# Patient Record
Sex: FEMALE | Race: WHITE | NOT HISPANIC OR LATINO
[De-identification: names, ages, dates, MRNs, and addresses within clinical notes are randomized per-mention and may not be internally consistent; named-entity substitution may affect disease eponyms.]

---

## 2018-02-18 ENCOUNTER — TRANSCRIPTION ENCOUNTER (OUTPATIENT)
Age: 48
End: 2018-02-18

## 2018-04-24 ENCOUNTER — TRANSCRIPTION ENCOUNTER (OUTPATIENT)
Age: 48
End: 2018-04-24

## 2018-08-24 ENCOUNTER — TRANSCRIPTION ENCOUNTER (OUTPATIENT)
Age: 48
End: 2018-08-24

## 2019-04-25 ENCOUNTER — FORM ENCOUNTER (OUTPATIENT)
Age: 49
End: 2019-04-25

## 2019-05-21 ENCOUNTER — FORM ENCOUNTER (OUTPATIENT)
Age: 49
End: 2019-05-21

## 2019-08-27 ENCOUNTER — APPOINTMENT (OUTPATIENT)
Dept: CARDIOLOGY | Facility: CLINIC | Age: 49
End: 2019-08-27
Payer: COMMERCIAL

## 2019-08-27 PROCEDURE — 93000 ELECTROCARDIOGRAM COMPLETE: CPT

## 2019-08-27 PROCEDURE — 99214 OFFICE O/P EST MOD 30 MIN: CPT

## 2019-10-08 ENCOUNTER — OUTPATIENT (OUTPATIENT)
Dept: OUTPATIENT SERVICES | Facility: HOSPITAL | Age: 49
LOS: 1 days | Discharge: HOME | End: 2019-10-08
Payer: COMMERCIAL

## 2019-10-08 DIAGNOSIS — R07.9 CHEST PAIN, UNSPECIFIED: ICD-10-CM

## 2019-10-08 PROCEDURE — 78452 HT MUSCLE IMAGE SPECT MULT: CPT | Mod: 26

## 2020-01-31 ENCOUNTER — EMERGENCY (EMERGENCY)
Facility: HOSPITAL | Age: 50
LOS: 0 days | Discharge: HOME | End: 2020-01-31
Attending: EMERGENCY MEDICINE | Admitting: EMERGENCY MEDICINE
Payer: COMMERCIAL

## 2020-01-31 VITALS
DIASTOLIC BLOOD PRESSURE: 69 MMHG | HEART RATE: 81 BPM | TEMPERATURE: 97 F | OXYGEN SATURATION: 97 % | SYSTOLIC BLOOD PRESSURE: 130 MMHG | RESPIRATION RATE: 18 BRPM

## 2020-01-31 VITALS
HEIGHT: 68 IN | HEART RATE: 101 BPM | RESPIRATION RATE: 18 BRPM | DIASTOLIC BLOOD PRESSURE: 88 MMHG | TEMPERATURE: 98 F | OXYGEN SATURATION: 98 % | WEIGHT: 167.99 LBS | SYSTOLIC BLOOD PRESSURE: 140 MMHG

## 2020-01-31 DIAGNOSIS — J02.9 ACUTE PHARYNGITIS, UNSPECIFIED: ICD-10-CM

## 2020-01-31 DIAGNOSIS — Z79.899 OTHER LONG TERM (CURRENT) DRUG THERAPY: ICD-10-CM

## 2020-01-31 DIAGNOSIS — R68.83 CHILLS (WITHOUT FEVER): ICD-10-CM

## 2020-01-31 LAB
FLU A RESULT: NEGATIVE — SIGNIFICANT CHANGE UP
FLU A RESULT: NEGATIVE — SIGNIFICANT CHANGE UP
FLUAV AG NPH QL: NEGATIVE — SIGNIFICANT CHANGE UP
FLUBV AG NPH QL: NEGATIVE — SIGNIFICANT CHANGE UP
RSV RESULT: NEGATIVE — SIGNIFICANT CHANGE UP
RSV RNA RESP QL NAA+PROBE: NEGATIVE — SIGNIFICANT CHANGE UP

## 2020-01-31 PROCEDURE — 99283 EMERGENCY DEPT VISIT LOW MDM: CPT

## 2020-01-31 RX ORDER — ROSUVASTATIN CALCIUM 5 MG/1
0 TABLET ORAL
Qty: 0 | Refills: 0 | DISCHARGE

## 2020-01-31 RX ORDER — LISINOPRIL 2.5 MG/1
0 TABLET ORAL
Qty: 0 | Refills: 0 | DISCHARGE

## 2020-01-31 RX ORDER — ASPIRIN/CALCIUM CARB/MAGNESIUM 324 MG
0 TABLET ORAL
Qty: 0 | Refills: 0 | DISCHARGE

## 2020-01-31 NOTE — ED PROVIDER NOTE - ATTENDING CONTRIBUTION TO CARE
assx in ED.  VS noted.  Chest clear.  Heart RR no murmur.  abd NT.  Ext FROM.  =pulses. dx testing reviewed.

## 2020-01-31 NOTE — ED PROVIDER NOTE - PHYSICAL EXAMINATION
CONSTITUTIONAL: well developed, nontoxic appearing, in no acute distress, speaking in full sentences  SKIN: warm, dry, no rash, cap refill < 2 seconds  HEENT: normocephalic, atraumatic, no conjunctival erythema, moist mucous membranes, patent airway, no tonsillar erythema/swelling/exudates, uvula midline, no dysphonia  NECK: supple, no masses, no anterior/posterior cervical lymphadenopathy   CV:  regular rate, regular rhythm, 2+ radial pulses bilaterally  RESP: no wheezes, no rales, no rhonchi, normal work of breathing  ABD: soft, nontender, nondistended, no rebound, no guarding  MSK: normal ROM, no cyanosis, no edema  NEURO: alert, oriented, grossly unremarkable  PSYCH: cooperative, appropriate

## 2020-01-31 NOTE — ED PROVIDER NOTE - CARE PROVIDER_API CALL
Terell Salguero (MD)  Medicine  [address] [place] [address]  Phone: [phone]  Fax: [phone]  Follow Up Time:

## 2020-01-31 NOTE — ED PROVIDER NOTE - NS ED ROS FT
GEN:  no fever, + chills  NEURO:  no headache, no dizziness  ENT: + sore throat, no runny nose  CV:  no chest pain, no palpitations  RESP:  no sob, no cough  GI:  no nausea, no vomiting, no abdominal pain, no diarrhea  :  no dysuria, no urinary frequency, no hematuria  MSK:  no joint pain, no edema  SKIN:  no rash, no bruising  HEME: no hematochezia, no melena

## 2020-01-31 NOTE — ED PROVIDER NOTE - PATIENT PORTAL LINK FT
You can access the FollowMyHealth Patient Portal offered by [facility] by registering at the following website: [email or web address]. By joining [facility]’s FollowMyHealth portal, you will also be able to view your health information using other applications (apps) compatible with our system.

## 2020-01-31 NOTE — ED PROVIDER NOTE - OBJECTIVE STATEMENT
48 yo F with PMHx of DM who was sent in for flu test. 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 Today she had mild chills and sore throat and was told by her boss that she needed to be tested for the flu before she can return to work. No fever, cough, runny nose, nausea, vomiting, abd pain, diarrhea.

## 2020-04-21 ENCOUNTER — APPOINTMENT (OUTPATIENT)
Dept: CARDIOLOGY | Facility: CLINIC | Age: 50
End: 2020-04-21

## 2020-04-25 ENCOUNTER — MESSAGE (OUTPATIENT)
Age: 50
End: 2020-04-25

## 2020-07-07 ENCOUNTER — APPOINTMENT (OUTPATIENT)
Dept: CARDIOLOGY | Facility: CLINIC | Age: 50
End: 2020-07-07
Payer: COMMERCIAL

## 2020-07-07 PROCEDURE — 93880 EXTRACRANIAL BILAT STUDY: CPT

## 2020-07-21 ENCOUNTER — APPOINTMENT (OUTPATIENT)
Dept: CARDIOLOGY | Facility: CLINIC | Age: 50
End: 2020-07-21
Payer: COMMERCIAL

## 2020-07-21 PROBLEM — I10 ESSENTIAL (PRIMARY) HYPERTENSION: Chronic | Status: ACTIVE | Noted: 2020-01-31

## 2020-07-21 PROBLEM — E11.9 TYPE 2 DIABETES MELLITUS WITHOUT COMPLICATIONS: Chronic | Status: ACTIVE | Noted: 2020-01-31

## 2020-07-21 PROBLEM — E78.00 PURE HYPERCHOLESTEROLEMIA, UNSPECIFIED: Chronic | Status: ACTIVE | Noted: 2020-01-31

## 2020-07-21 PROCEDURE — 99214 OFFICE O/P EST MOD 30 MIN: CPT

## 2020-07-21 PROCEDURE — 93000 ELECTROCARDIOGRAM COMPLETE: CPT

## 2021-02-09 ENCOUNTER — FORM ENCOUNTER (OUTPATIENT)
Age: 51
End: 2021-02-09

## 2021-04-05 ENCOUNTER — APPOINTMENT (OUTPATIENT)
Dept: CARDIOLOGY | Facility: CLINIC | Age: 51
End: 2021-04-05
Payer: COMMERCIAL

## 2021-04-05 PROCEDURE — 93306 TTE W/DOPPLER COMPLETE: CPT

## 2021-04-05 PROCEDURE — 99072 ADDL SUPL MATRL&STAF TM PHE: CPT

## 2021-04-15 ENCOUNTER — APPOINTMENT (OUTPATIENT)
Dept: CARDIOLOGY | Facility: CLINIC | Age: 51
End: 2021-04-15
Payer: COMMERCIAL

## 2021-04-15 VITALS
BODY MASS INDEX: 25.9 KG/M2 | WEIGHT: 165 LBS | TEMPERATURE: 98 F | SYSTOLIC BLOOD PRESSURE: 150 MMHG | HEART RATE: 88 BPM | HEIGHT: 67 IN | DIASTOLIC BLOOD PRESSURE: 90 MMHG

## 2021-04-15 DIAGNOSIS — Z00.00 ENCOUNTER FOR GENERAL ADULT MEDICAL EXAMINATION W/OUT ABNORMAL FINDINGS: ICD-10-CM

## 2021-04-15 PROCEDURE — 99214 OFFICE O/P EST MOD 30 MIN: CPT

## 2021-04-15 PROCEDURE — 93000 ELECTROCARDIOGRAM COMPLETE: CPT

## 2021-04-15 PROCEDURE — 99072 ADDL SUPL MATRL&STAF TM PHE: CPT

## 2021-08-05 ENCOUNTER — APPOINTMENT (OUTPATIENT)
Dept: OBGYN | Facility: CLINIC | Age: 51
End: 2021-08-05
Payer: COMMERCIAL

## 2021-08-05 VITALS
SYSTOLIC BLOOD PRESSURE: 143 MMHG | HEART RATE: 94 BPM | DIASTOLIC BLOOD PRESSURE: 90 MMHG | HEIGHT: 67 IN | WEIGHT: 168 LBS | BODY MASS INDEX: 26.37 KG/M2

## 2021-08-05 PROCEDURE — 99213 OFFICE O/P EST LOW 20 MIN: CPT

## 2021-08-05 NOTE — HISTORY OF PRESENT ILLNESS
[FreeTextEntry1] : ascus....for repeat pap\par \par last visit\par \par    	Print\par Patient\par Name	ALIS POE (49yo, F) ID# [id number]	Appt. Date/Time	02/10/2021 04:00PM\par 	1970	Service Dept.	[facility] OFFICE\par Provider	SHERLY HARRIS MD\par Insurance	\par Med Primary: [employer]\par Insurance # : [id number]\par Prescription: [employer] - Member is eligible. details\par Chief Complaint\par Well Woman Visit\par Patient's Care Team\par Primary Care Provider: LYUDMILA EDMONDS MD: [address] [address], Ph [phone], Fax [phone] NPI: [id number]\par Notes: Dr Edmonds moved to [facility] same TEL #\par Patient's Pharmacies\par [id number] ([place]): [address] [place] [address], Ph [phone], Fax [phone]\par [facility] - [facility] [address] [place] [address], Ph [phone]\par Vitals\par 02/10/2021 04:28 pm\par Ht:	5 ft 7 in\par Wt:	170 lbs\par BMI:	26.6\par BP:	122/78 sitting\par Allergies\par Reviewed Allergies\par NKDA\par Medications\par Reviewed Medications\par Accu-Chek Judie Plus Meter\par 05/28/15   filled	[facility]\par acyclovir 5 % topical ointment\par 18   filled	[facility]\par amoxicillin 500 mg tablet\par 10/14/15   filled	[facility]\par amoxicillin 500 mg-potassium clavulanate 125 mg tablet\par 17   filled	[facility]\par amoxicillin 875 mg tablet\par 10/01/18   filled	[facility]\par azithromycin 250 mg tablet\par UTD\par 20   filled	[facility]\par chlorhexidine gluconate 0.12 % mouthwash\par 10/14/15   filled	[facility]\par clotrimazole-betamethasone 1 %-0.05 % topical cream\par APPLY TO THE AFFECTED AND SURROUNDING AREAS OF SKIN BY TOPICAL ROUTE 2 TIMES PER DAY IN THE MORNING AND EVENING FOR 2 WEEKS\par 18   filled	[facility]\par Contour Next Test Strips\par 10/28/20   filled	[facility]\par Dexcom G6  misc\par U TO MONITOR GLUCOSE\par 20   filled	[facility]\par Dexcom G6 Sensor device\par CHANGE Q 10 DAYS UTD\par 09/10/20   filled	[facility]\par Dexcom G6 Transmitter device\par CHANGE Q 90 DAYS UTD\par 20   filled	[facility]\par ergocalciferol (vitamin D2) 1,250 mcg (50,000 unit) capsule\par 10/05/20   filled	[facility]\par erythromycin 5 mg/gram (0.5 %) eye ointment\par APPLY A SMALL AMOUNT INTO RIGHT EYE TID\par 20   filled	[facility]\par fluocinolone 0.01 % topical solution\par 19   filled	[facility]\par GaviLyte-G 236 gram-22.74 gram-6.74 gram-5.86 gram oral solution\par 17   filled	[facility]\par gentamicin 0.3 % eye drops\par 18   filled	[facility]\par HumaLOG U-100 Insulin 100 unit/mL subcutaneous solution\par 10/28/20   filled	[facility]\par ibuprofen 400 mg tablet\par 16   filled	[facility]\par ketoconazole 2 % shampoo\par 05/15/19   filled	[facility]\par lancets 30 gauge\par 05/28/15   filled	[facility]\par lancing device\par 05/28/15   filled	[facility]\par losartan 25 mg tablet\par 20   filled	[facility]\par Lupron Depot 11.25 mg (3 month) intramuscular syringe kit\par 16   filled	[facility]\par neomycin-polymyxin-hydrocort 3.5 mg-10,000 unit/mL-1 % ear drops,susp\par 19   filled	[facility]\par nystatin 100,000 unit/mL oral suspension\par 17   filled	[facility]\par rosuvastatin 5 mg tablet\par 10/15/20   filled	[facility]\par simvastatin 20 mg tablet\par 16   filled	[facility]\par sodium chloride 5 % eye drops\par INT 1 GTT IN OD QID UTD\par 20   filled	[facility]\par Synalar TS 0.01 % topical kit\par 16   filled	[facility]\par tobramycin 0.3 %-dexamethasone 0.1 % eye drops,suspension\par 18   filled	[facility]\par valACYclovir 1 gram tablet\par 18   filled	[facility]\par Problems\par Reviewed Problems\par Menorrhagia\par Gynecologic examination\par Family History\par Reviewed Family History\par Father	- Cerebrovascular accident ( age: 64)\par Mother	- Hypertensive disorder\par Social History\par Reviewed Social History\par Tobacco Smoking Status: Never smoker\par Most Recent Tobacco Use Screenin/10/2021\par Surgical History\par Surgical History not reviewed (last reviewed 01/15/2020)\par Endometrial ablation - 2016\par Caesarean Section - X2\par Dilation and Curettage\par Other - ENDOMETRIAL BIOPSY 2016 NORMAL AS PER DR. PULIDO\par GYN History\par Reviewed GYN History\par LMP: Definite.\par Date of LMP: 2016 (Notes: ablation).\par LUPRON ON \par Obstetric History\par Reviewed Obstetric History\par TOTAL	FULL	PRE	AB. I	AB. S	ECTOPICS	MULTIPLE	LIVING\par 2	2						2\par Past Medical History\par Past Medical History not reviewed (last reviewed 2018)\par Diabetes: Y\par Screening\par None recorded.\par HPI\par amenorrhea/cramps\par \par ROS\par Patient reports abdominal pain (lower) but reports no heartburn, no dysphagia, no nausea, no vomiting, no bowel movement changes, no diarrhea, no constipation, and no rectal bleeding. She reports abnormal bleeding but reports no hematuria, no flank pain, no trouble urinating, no incontinence, no rash, no lesion, no discharge, no vaginal odor, and no vaginal itching. She reports no fatigue, no fever, no significant weight gain, and no significant weight loss. She reports no abnormal moles and no rashes. She reports no irritation and no vision changes. She reports no hearing loss, no ear pain, no nose/sinus problems, no sore throat, no snoring, no dry mouth, and no mouth ulcers. She reports no dyspnea / shortness of breath, no cough, no sputum production, no hemoptysis, and no wheezing. She reports no chest pain, no palpitations, and no orthopnea. She reports no menstrual problems and no PMDD symptoms. She reports no menopausal symptoms. She reports no sexual problems. She reports no muscle aches, no muscle weakness, no arthralgias/joint pain, and no back pain. She reports no headaches, no dizziness, no LOC, no weakness, no numbness, and no seizures. She reports no depression, no alcoholism, and no sleep disturbances.\par Physical Exam\par Patient is a 50-year-old female.\par \par Chaperone: Chaperone: present.\par \par Female Genitalia: Vulva: no masses, atrophy, or lesions. Bladder/Urethra: no urethral discharge or mass and normal meatus and bladder non distended. Vagina no tenderness, erythema, cystocele, rectocele, abnormal vaginal discharge, or vesicle(s) or ulcers. Cervix: no discharge or cervical motion tenderness and grossly normal. Uterus: midline, mobile, non-tender, normal shape, no uterine prolapse, and enlarged. Adnexa/Parametria: no parametrial tenderness or mass and no adnexal tenderness or ovarian mass.\par \par Breast: Inspection/Palpation: no erythema, induration, tenderness, skin changes, abnormal secretions, or distinct masses and normal nipple appearance and non tender axillary lymph nodes.\par \par Abdomen: Auscultation/Inspection/Palpation: no tenderness, hepatomegaly, splenomegaly, masses, or CVA tenderness and soft, non-distended, and normal bowel sounds. Hernia: none palpated.\par Assessment / Plan\par 1. Gynecologic examination\par Z01.411: Encounter for gynecological examination (general) (routine) with abnormal findings\par PAP, LB\par MAMMO, SCREENING, BILATERAL\par \par 2. Atypical squamous cells of undetermined significance on cervical Papanicolaou smear -\par high risk pap panel negative, pap in 6 months\par \par R87.610: Atypical squamous cells of undetermined significance on cytologic smear of cervix (ASC-US)\par \par 3. Hypertrophy of uterus -\par cramps\par \par N85.2: Hypertrophy of uterus\par \par 4. Amenorrhea -\par SINCE ABLATION\par \par hormone profile to r/o menopause\par \par N91.2: Amenorrhea, unspecified\par US, TRANSVAGINAL\par \par US, TRANSVAGINAL\par \par Review of us, transvaginal taken on 02/10/2021 at [facility] OFF[facility] shows:\par Imaging Studies:\par Indications: pelvic pain and abnormal bleeding.\par Uterus: volume (cc): 125.\par Endometrium: thickness 2.3 mm.\par Cervix: normal.\par Cul de sac: no fluid was demonstrated.\par Right Ovary: volume (cc): 3.9.\par Left Ovary: volume (cc): 6.8.\par \par Return to Office\par None recorded.\par Encounter Sign-Off\par Encounter signed-off by Sherly Harris MD, 02/10/2021.\par Encounter performed and documented by Sherly Harris MD\par Encounter reviewed & signed by Sherly Harris MD on 02/10/2021 at 4:49pm\par Audit history\par

## 2021-08-12 LAB
C TRACH RRNA SPEC QL NAA+PROBE: NOT DETECTED
HPV HIGH+LOW RISK DNA PNL CVX: NOT DETECTED
N GONORRHOEA RRNA SPEC QL NAA+PROBE: NOT DETECTED
SOURCE AMPLIFICATION: NORMAL

## 2021-08-21 LAB — CYTOLOGY CVX/VAG DOC THIN PREP: NORMAL

## 2021-10-21 ENCOUNTER — APPOINTMENT (OUTPATIENT)
Dept: CARDIOLOGY | Facility: CLINIC | Age: 51
End: 2021-10-21
Payer: COMMERCIAL

## 2021-10-21 ENCOUNTER — RESULT CHARGE (OUTPATIENT)
Age: 51
End: 2021-10-21

## 2021-10-21 VITALS
HEART RATE: 74 BPM | OXYGEN SATURATION: 98 % | DIASTOLIC BLOOD PRESSURE: 80 MMHG | SYSTOLIC BLOOD PRESSURE: 112 MMHG | HEIGHT: 67 IN | TEMPERATURE: 98 F | BODY MASS INDEX: 24.8 KG/M2 | WEIGHT: 158 LBS

## 2021-10-21 PROCEDURE — 99213 OFFICE O/P EST LOW 20 MIN: CPT

## 2021-10-21 PROCEDURE — 93000 ELECTROCARDIOGRAM COMPLETE: CPT

## 2021-10-21 RX ORDER — LOSARTAN POTASSIUM 50 MG/1
50 TABLET, FILM COATED ORAL
Qty: 90 | Refills: 3 | Status: DISCONTINUED | COMMUNITY
Start: 2021-04-15 | End: 2021-10-21

## 2022-01-19 ENCOUNTER — APPOINTMENT (OUTPATIENT)
Dept: OBGYN | Facility: CLINIC | Age: 52
End: 2022-01-19
Payer: COMMERCIAL

## 2022-01-19 ENCOUNTER — APPOINTMENT (OUTPATIENT)
Dept: OBGYN | Facility: CLINIC | Age: 52
End: 2022-01-19

## 2022-01-19 ENCOUNTER — NON-APPOINTMENT (OUTPATIENT)
Age: 52
End: 2022-01-19

## 2022-01-19 VITALS
HEIGHT: 67 IN | HEART RATE: 82 BPM | TEMPERATURE: 96.8 F | BODY MASS INDEX: 25.11 KG/M2 | WEIGHT: 160 LBS | DIASTOLIC BLOOD PRESSURE: 81 MMHG | SYSTOLIC BLOOD PRESSURE: 131 MMHG

## 2022-01-19 DIAGNOSIS — B37.3 CANDIDIASIS OF VULVA AND VAGINA: ICD-10-CM

## 2022-01-19 PROCEDURE — 99396 PREV VISIT EST AGE 40-64: CPT

## 2022-01-19 NOTE — HISTORY OF PRESENT ILLNESS
[FreeTextEntry1] : feels well, except for recurrent yeast infections which she attributes to her diabetes\par hx of ascus\par here for annual

## 2022-01-22 LAB — HPV HIGH+LOW RISK DNA PNL CVX: NOT DETECTED

## 2022-01-25 LAB — CYTOLOGY CVX/VAG DOC THIN PREP: ABNORMAL

## 2022-03-25 ENCOUNTER — EMERGENCY (EMERGENCY)
Facility: HOSPITAL | Age: 52
LOS: 0 days | Discharge: HOME | End: 2022-03-25
Attending: EMERGENCY MEDICINE | Admitting: EMERGENCY MEDICINE
Payer: OTHER MISCELLANEOUS

## 2022-03-25 VITALS
HEART RATE: 98 BPM | SYSTOLIC BLOOD PRESSURE: 136 MMHG | TEMPERATURE: 97 F | DIASTOLIC BLOOD PRESSURE: 79 MMHG | OXYGEN SATURATION: 98 % | RESPIRATION RATE: 17 BRPM | HEIGHT: 68 IN
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
## 2022-03-25 DIAGNOSIS — Z79.82 LONG TERM (CURRENT) USE OF ASPIRIN: ICD-10-CM
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
## 2022-03-25 DIAGNOSIS — S29.012A STRAIN OF MUSCLE AND TENDON OF BACK WALL OF THORAX, INITIAL ENCOUNTER: ICD-10-CM
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
## 2022-03-25 DIAGNOSIS — E11.9 TYPE 2 DIABETES MELLITUS WITHOUT COMPLICATIONS: ICD-10-CM

## 2022-03-25 DIAGNOSIS — M25.512 PAIN IN LEFT SHOULDER: ICD-10-CM

## 2022-03-25 DIAGNOSIS — E78.00 PURE HYPERCHOLESTEROLEMIA, UNSPECIFIED: ICD-10-CM

## 2022-03-25 DIAGNOSIS — I10 ESSENTIAL (PRIMARY) HYPERTENSION: ICD-10-CM

## 2022-03-25 PROCEDURE — 73030 X-RAY EXAM OF SHOULDER: CPT | Mod: 26,LT

## 2022-03-25 PROCEDURE — 99284 EMERGENCY DEPT VISIT MOD MDM: CPT

## 2022-03-25 RX ORDER — METHOCARBAMOL 500 MG/1
2 TABLET, FILM COATED ORAL
Qty: 60 | Refills: 0
Start: 2022-03-25 | End: 2022-04-08

## 2022-03-25 NOTE — ED PROVIDER NOTE - NSFOLLOWUPINSTRUCTIONS_ED_ALL_ED_FT
Follow-up with Employee Health for modified work duty.    Strain    A strain is a stretch or tear in one of the muscles in your body. This is caused by an injury to the area such as a twisting mechanism. Symptoms include pain, swelling, or bruising. Rest that area over the next several days and slowly resume activity when tolerated. Ice can help with swelling and pain.     SEEK IMMEDIATE MEDICAL CARE IF YOU HAVE ANY OF THE FOLLOWING SYMPTOMS: worsening pain, inability to move that body part, numbness or tingling.

## 2022-03-25 NOTE — ED PROVIDER NOTE - PATIENT PORTAL LINK FT
You can access the FollowMyHealth Patient Portal offered by [facility] by registering at the following website: [email or web address]. By joining [facility]’s FollowMyHealth portal, you will also be able to view your health information using other applications (apps) compatible with our system.

## 2022-03-25 NOTE — ED PROVIDER NOTE - OBJECTIVE STATEMENT
patient c/o left shoulder pain, sudden onset 3 days ago while moving patient at work, Pain felt with movement, no numbness, no chest pain, no SOB,

## 2022-03-25 NOTE — ED PROVIDER NOTE - ATTENDING CONTRIBUTION TO CARE
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  Pain is worse with motion.  Does not radiate.  No other complaints    Exam: No bony tenderness of left shoulder, full range of motion of shoulder, normal skin, mild soreness of trapezius, 5 out of 5 hand , 2+ radial pulse  Plan: X-ray, muscle relaxants, NSAIDs

## 2022-03-25 NOTE — ED PROVIDER NOTE - NS ED ATTENDING STATEMENT MOD
This was a shared visit with the MICHELE. I reviewed and verified the documentation and independently performed the documented:

## 2022-03-25 NOTE — ED ADULT NURSE NOTE - OBJECTIVE STATEMENT
Pt a&ox3, in ED for a left shoulder injury after helping a patient get in bed, pt states pain progressively got worse and today and radiated to neck, no SOB, unlabored breathing, equal rise & fall, denies pain in any other location, no N/V/D, PMH of DM, HTN, HLD

## 2022-04-27 ENCOUNTER — APPOINTMENT (OUTPATIENT)
Dept: CARDIOLOGY | Facility: CLINIC | Age: 52
End: 2022-04-27
Payer: COMMERCIAL

## 2022-04-27 VITALS
HEIGHT: 67 IN | DIASTOLIC BLOOD PRESSURE: 84 MMHG | SYSTOLIC BLOOD PRESSURE: 142 MMHG | WEIGHT: 164.6 LBS | BODY MASS INDEX: 25.83 KG/M2

## 2022-04-27 PROCEDURE — 93000 ELECTROCARDIOGRAM COMPLETE: CPT

## 2022-04-27 PROCEDURE — 99213 OFFICE O/P EST LOW 20 MIN: CPT

## 2022-04-27 RX ORDER — INSULIN LISPRO 100 [IU]/ML
100 INJECTION, SOLUTION INTRAVENOUS; SUBCUTANEOUS
Refills: 0 | Status: ACTIVE | COMMUNITY

## 2022-04-27 RX ORDER — ROSUVASTATIN CALCIUM 5 MG/1
5 TABLET, FILM COATED ORAL DAILY
Qty: 90 | Refills: 3 | Status: COMPLETED | COMMUNITY
Start: 2021-10-21 | End: 2022-04-27

## 2022-04-27 RX ORDER — LOSARTAN POTASSIUM 25 MG/1
25 TABLET, FILM COATED ORAL
Qty: 90 | Refills: 2 | Status: COMPLETED | COMMUNITY
End: 2022-04-27

## 2022-04-27 RX ORDER — FLUCONAZOLE 200 MG/1
200 TABLET ORAL
Qty: 3 | Refills: 3 | Status: DISCONTINUED | COMMUNITY
Start: 2022-01-19 | End: 2022-04-27

## 2022-07-25 ENCOUNTER — APPOINTMENT (OUTPATIENT)
Dept: ORTHOPEDIC SURGERY | Facility: CLINIC | Age: 52
End: 2022-07-25

## 2022-07-25 PROCEDURE — 99213 OFFICE O/P EST LOW 20 MIN: CPT

## 2022-07-25 RX ORDER — BLOOD SUGAR DIAGNOSTIC
STRIP MISCELLANEOUS
Qty: 800 | Refills: 0 | Status: DISCONTINUED | COMMUNITY
Start: 2022-03-29

## 2022-07-25 RX ORDER — METHOCARBAMOL 500 MG/1
500 TABLET, FILM COATED ORAL
Qty: 60 | Refills: 0 | Status: DISCONTINUED | COMMUNITY
Start: 2022-03-25

## 2022-07-25 RX ORDER — INSULIN LISPRO 100 [IU]/ML
100 INJECTION, SOLUTION INTRAVENOUS; SUBCUTANEOUS
Qty: 90 | Refills: 0 | Status: DISCONTINUED | COMMUNITY
Start: 2022-03-05

## 2022-07-25 RX ORDER — MELOXICAM 15 MG/1
15 TABLET ORAL
Qty: 30 | Refills: 0 | Status: DISCONTINUED | COMMUNITY
Start: 2022-03-25

## 2022-07-25 NOTE — PHYSICAL EXAM
[Left] : left shoulder [] : no tenderness to palpation [de-identified] :   Good strength with rotator cuff resistance testing. [TWNoteComboBox7] : active forward flexion 170 degrees [de-identified] : active abduction 150 degrees [TWNoteComboBox6] : internal rotation L2

## 2022-07-25 NOTE — HISTORY OF PRESENT ILLNESS
[de-identified] : The patient is a 52-year-old female here for subsequent re-evaluation of her left shoulder.  MRI showed bursitis.  She is doing formal physical therapy and it has helped her.  She states her shoulder feels much better.  She is seeing Dr. Martin of Neurology for her lumbar spine.

## 2022-07-25 NOTE — DISCUSSION/SUMMARY
[de-identified] :   At this point her shoulder has improved nicely with the formal physical therapy.  She will convert to a home program.  I will see her back in 2 months for further evaluation.  She will see Dr. Martin of Neurology tomorrow for her lumbar spine.  She will call me if that appointment so we can determine when she will return to work.  Right now, she is unable work 100% temporary disabled.\par \par   Supervising physician:

## 2022-07-26 ENCOUNTER — APPOINTMENT (OUTPATIENT)
Dept: NEUROLOGY | Facility: CLINIC | Age: 52
End: 2022-07-26

## 2022-07-26 VITALS
HEART RATE: 85 BPM | DIASTOLIC BLOOD PRESSURE: 76 MMHG | BODY MASS INDEX: 25.74 KG/M2 | WEIGHT: 164 LBS | HEIGHT: 67 IN | SYSTOLIC BLOOD PRESSURE: 122 MMHG

## 2022-07-26 PROCEDURE — 99214 OFFICE O/P EST MOD 30 MIN: CPT

## 2022-07-26 NOTE — ASSESSMENT
[FreeTextEntry1] : Lumbar radiculopathy- much improved, even though she does still have residual pain  even without exerting herself.  However, she would like to try to return to work, and because there are no modified duty for her work, she would have to return to full duty.     since she is not doing physical therapy anymore, she is recommended to continue with her routine stretches.  She is no longer taking any  pain medication at this time\par \par   Left shoulder pain- resolved\par \par  she is considered to have mild partial disability due to residual pain, but is cleared to  try to go back to work full duty with no restrictions on August 8, 2022.

## 2022-07-26 NOTE — HISTORY OF PRESENT ILLNESS
[FreeTextEntry1] : It is a pleasure to see Ms.Alzbeta Weldon in the office today. She is a 52-year-old woman who presents to the office for the evaluation of back pain going down to her left buttock as well as left shoulder pain as a result of a work-related accident which took place on March 23, 2022. 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 The next day she developed severe back pain that was shooting to her left buttock as well as left shoulder pain. She was seen at Orthopedics and was found to have bursitis and with therapy that has gotten significantly better. Her left shoulder pain is now only rated at a 3 on a 10 at most. Her back pain however continues to bother her significantly. She has a baseline pain of 5-6/10 and it can go up to 8-9/10 be days a week. She has done physical therapy for about 6 sessions so far. She denies any significant weakness numbness or urinary/bowel incontinence. She has not worked since the accident.\par \par Today, patient presents for neurologic revisit encounter regarding back pain  as a result of a work-related accident which took place on March 23, 2022. Since last visit, patient reports improvement. She states rates her pain anywhere between 0-5/10 on the pain scale. Physical activity can trigger her pain. The pain is intermittent. She completed MRI of the lumbar spine which demonstrates bulging disc and degenerative changes. She is no longer going to therapy but does home exercises. The patient has not returned to work yet but is willing to try to return. Of note, with respect to her shoulder, she states also improving. She sees Orthopedics for further evaluation.

## 2022-08-16 ENCOUNTER — NON-APPOINTMENT (OUTPATIENT)
Age: 52
End: 2022-08-16

## 2022-09-21 ENCOUNTER — APPOINTMENT (OUTPATIENT)
Dept: NEUROLOGY | Facility: CLINIC | Age: 52
End: 2022-09-21

## 2022-09-21 VITALS
BODY MASS INDEX: 25.74 KG/M2 | SYSTOLIC BLOOD PRESSURE: 129 MMHG | HEIGHT: 67 IN | HEART RATE: 84 BPM | DIASTOLIC BLOOD PRESSURE: 83 MMHG | WEIGHT: 164 LBS

## 2022-09-21 DIAGNOSIS — M54.16 RADICULOPATHY, LUMBAR REGION: ICD-10-CM

## 2022-09-21 PROCEDURE — 99072 ADDL SUPL MATRL&STAF TM PHE: CPT

## 2022-09-21 PROCEDURE — 99213 OFFICE O/P EST LOW 20 MIN: CPT

## 2022-09-21 NOTE — HISTORY OF PRESENT ILLNESS
[FreeTextEntry1] : Ms. ALIS POE returns to the office for follow-up and her prior history and physical have been reviewed and she reports no change since last visit.  she has been seeing us for Back pain as a result of a work related accident which took place on March 23, 2022.  She did improve with physical therapy and 2 months ago we clear her back to work because pain was under control and she really wanted to go back to work.  Today's visit was a routine follow-up to see how well or hot symptomatic she still is after going back to work, however she never went back to work because Orthopedics never clear her back to work due to bursitis in her shoulders.  Her back pain has remained  relative the same as last time with mild pain between 1-4.   she has not done any physical therapy recently and has  only taken Tylenol infrequently.  No new complaints today\par \par  she reports that her shoulder is feeling better, and she really wants to go back to work.  She has appointment with Orthopedics coming up to get clearance

## 2022-09-21 NOTE — ASSESSMENT
[FreeTextEntry1] : Lumbar radiculopathy- much improved, even though she does still have residual pain  even without exerting herself.    no further neurological workup or intervention necessary at this time.  She is cleared to go back to work from Neurology standpoint, but will still need clearance from Orthopedics.\par \par   Left shoulder pain- resolved\par \par  she is considered to have mild partial disability due to residual pain, but is cleared to  try to go back to work full duty with no restrictions.

## 2022-09-26 ENCOUNTER — APPOINTMENT (OUTPATIENT)
Dept: ORTHOPEDIC SURGERY | Facility: CLINIC | Age: 52
End: 2022-09-26

## 2022-09-26 PROCEDURE — 99072 ADDL SUPL MATRL&STAF TM PHE: CPT | Mod: ACP

## 2022-09-26 PROCEDURE — 99213 OFFICE O/P EST LOW 20 MIN: CPT | Mod: ACP

## 2022-09-26 NOTE — PHYSICAL EXAM
[Left] : left shoulder [] : no tenderness to palpation [de-identified] :   Good strength with rotator cuff resistance testing. [TWNoteComboBox7] : active forward flexion 170 degrees [de-identified] : active abduction 165 degrees [TWNoteComboBox6] : internal rotation L2

## 2022-09-26 NOTE — HISTORY OF PRESENT ILLNESS
[de-identified] : The patient is a 52-year-old female here for subsequent re-evaluation of her left shoulder.  MRI showed bursitis.  She is doing formal physical therapy and it has helped her.  She states her shoulder feels much better.

## 2022-09-26 NOTE — DISCUSSION/SUMMARY
[de-identified] : The patient will return to work full duty mild degree of disability as of 9/27/2022, follow up 2 months. \par \par   Supervising physician: Dr. Koo

## 2022-11-04 ENCOUNTER — APPOINTMENT (OUTPATIENT)
Dept: CARDIOLOGY | Facility: CLINIC | Age: 52
End: 2022-11-04

## 2022-11-04 VITALS
TEMPERATURE: 97.6 F | HEART RATE: 74 BPM | DIASTOLIC BLOOD PRESSURE: 82 MMHG | SYSTOLIC BLOOD PRESSURE: 124 MMHG | RESPIRATION RATE: 16 BRPM | WEIGHT: 163 LBS | BODY MASS INDEX: 25.58 KG/M2 | HEIGHT: 67 IN

## 2022-11-04 DIAGNOSIS — Z78.9 OTHER SPECIFIED HEALTH STATUS: ICD-10-CM

## 2022-11-04 DIAGNOSIS — E78.5 HYPERLIPIDEMIA, UNSPECIFIED: ICD-10-CM

## 2022-11-04 DIAGNOSIS — Z79.4 TYPE 2 DIABETES MELLITUS W/OUT COMPLICATIONS: ICD-10-CM

## 2022-11-04 DIAGNOSIS — I10 ESSENTIAL (PRIMARY) HYPERTENSION: ICD-10-CM

## 2022-11-04 DIAGNOSIS — E11.9 TYPE 2 DIABETES MELLITUS W/OUT COMPLICATIONS: ICD-10-CM

## 2022-11-04 PROCEDURE — 93000 ELECTROCARDIOGRAM COMPLETE: CPT

## 2022-11-04 PROCEDURE — 99214 OFFICE O/P EST MOD 30 MIN: CPT

## 2022-11-04 RX ORDER — LOSARTAN POTASSIUM 50 MG/1
50 TABLET, FILM COATED ORAL
Qty: 90 | Refills: 3 | Status: DISCONTINUED | COMMUNITY
End: 2022-11-04

## 2022-11-04 RX ORDER — CLOTRIMAZOLE AND BETAMETHASONE DIPROPIONATE 10; .5 MG/G; MG/G
1-0.05 CREAM TOPICAL TWICE DAILY
Qty: 1 | Refills: 3 | Status: DISCONTINUED | COMMUNITY
Start: 2022-01-19 | End: 2022-11-04

## 2022-11-04 RX ORDER — ASPIRIN 81 MG
81 TABLET, DELAYED RELEASE (ENTERIC COATED) ORAL DAILY
Refills: 0 | Status: DISCONTINUED | COMMUNITY
End: 2022-11-04

## 2022-11-04 RX ORDER — ERGOCALCIFEROL 1.25 MG/1
1.25 MG CAPSULE, LIQUID FILLED ORAL
Qty: 12 | Refills: 0 | Status: ACTIVE | COMMUNITY
Start: 2022-01-12

## 2022-11-04 NOTE — HISTORY OF PRESENT ILLNESS
[FreeTextEntry1] : Ms. Weldon is a 52-year-old woman with history of HTN, HLD, and IDDM2 presenting for follow up.\par \par Patient previously followed with Dr. Eason and was last seen in office 4/2022.\par Today she feels well, denies active cardiopulmonary complaints.\par \par TTE 4/2021\par - Normal biventricular function, mild MR\par \par NM Stress Treadmill\par - 7 METS, 6.5 minutes, 95%MPHR\par - Negative stress test at a moderate workload\par \par Labs:\par - , HDL 54, TG 92, , non-\par - Cr 0.66, K 4.3\par - Self reported A1c ~7.8%

## 2022-11-04 NOTE — ASSESSMENT
[FreeTextEntry1] : Ms. Weldon is a 52-year-old woman with history of HTN, HLD, and IDDM2 presenting for follow up.\par \par Impression:\par (1) HTN, well controlled\par (2) HLD, goal LDL <70 given IDDM2\par (3) IDDM2, poorly controlled with reported A1c ~7.8%\par \par Plan:\par - Discussed her prior cardiac workup in detail including echocardiography and nuclear stress testing, both of which were unremarkable.\par - Discussed intermittent fasting as a means to improve insulin resistance/sensitivity and possibly lose weight.\par - Discontinue aspirin, patient is not at high risk for cardiovascular events.\par - Continue statin\par - Continue losartan\par \par RTC 6 months, repeat labs prior

## 2023-01-25 ENCOUNTER — APPOINTMENT (OUTPATIENT)
Dept: OBGYN | Facility: CLINIC | Age: 53
End: 2023-01-25
Payer: COMMERCIAL

## 2023-01-25 ENCOUNTER — NON-APPOINTMENT (OUTPATIENT)
Age: 53
End: 2023-01-25

## 2023-01-25 VITALS
DIASTOLIC BLOOD PRESSURE: 85 MMHG | HEART RATE: 83 BPM | SYSTOLIC BLOOD PRESSURE: 129 MMHG | WEIGHT: 166 LBS | BODY MASS INDEX: 26.06 KG/M2 | HEIGHT: 67 IN

## 2023-01-25 DIAGNOSIS — R87.610 ATYPICAL SQUAMOUS CELLS OF UNDETERMINED SIGNIFICANCE ON CYTOLOGIC SMEAR OF CERVIX (ASC-US): ICD-10-CM

## 2023-01-25 DIAGNOSIS — N95.1 MENOPAUSAL AND FEMALE CLIMACTERIC STATES: ICD-10-CM

## 2023-01-25 DIAGNOSIS — Z01.419 ENCOUNTER FOR GYNECOLOGICAL EXAMINATION (GENERAL) (ROUTINE) W/OUT ABNORMAL FINDINGS: ICD-10-CM

## 2023-01-25 PROCEDURE — 99396 PREV VISIT EST AGE 40-64: CPT

## 2023-01-25 NOTE — HISTORY OF PRESENT ILLNESS
[Hot Flashes] : hot flashes [Night Sweats] : night sweats [FreeTextEntry1] : here for her annual visit\par \par last NII VISIT:\par \par Patient\par Name	ALIS POE (51yo, F) ID# [id number]	Appt. Date/Time	02/10/2021 04:00PM\par 	1970	Service Dept.	[facility] OFFICE\par Provider	SHERLY HARRIS MD\par Insurance	\par Med Primary: [employer]\par Insurance # : [id number]\par Prescription: [employer] - Member is eligible. details\par Chief Complaint\par Well Woman Visit\par Patient's Care Team\par Primary Care Provider: LYUDMILA EDMONDS MD: [address] [address], Ph [phone], Fax [phone] NPI: [id number]\par Notes: Dr Edmonds moved to [address] [facility] same TEL #\par Patient's Pharmacies\par [id number] ([facility]): [address] [place] [address], Ph [phone], Fax [phone]\par [facility] - [address] [facility] [address] [place] [address], Ph [phone]\par Vitals\par 02/10/2021 04:28 pm\par Ht:	5 ft 7 in\par Wt:	170 lbs\par BMI:	26.6\par BP:	122/78 sitting\par Allergies\par Reviewed Allergies\par NKDA\par Medications\par Reviewed Medications\par Accu-Chek Judie Plus Meter\par 05/28/15   filled	[facility]\par acyclovir 5 % topical ointment\par 18   filled	[facility]\par amoxicillin 500 mg tablet\par 10/14/15   filled	[facility]\par amoxicillin 500 mg-potassium clavulanate 125 mg tablet\par 17   filled	[facility]\par amoxicillin 875 mg tablet\par 10/01/18   filled	[facility]\par azithromycin 250 mg tablet\par UTD\par 20   filled	[facility]\par chlorhexidine gluconate 0.12 % mouthwash\par 10/14/15   filled	[facility]\par clotrimazole-betamethasone 1 %-0.05 % topical cream\par APPLY TO THE AFFECTED AND SURROUNDING AREAS OF SKIN BY TOPICAL ROUTE 2 TIMES PER DAY IN THE MORNING AND EVENING FOR 2 WEEKS\par 18   filled	[facility]\par Contour Next Test Strips\par 10/28/20   filled	[facility]\par Dexcom G6  misc\par U TO MONITOR GLUCOSE\par 20   filled	[facility]\par Dexcom G6 Sensor device\par CHANGE Q 10 DAYS UTD\par 09/10/20   filled	[facility]\par Dexcom G6 Transmitter device\par CHANGE Q 90 DAYS UTD\par 20   filled	[facility]\par ergocalciferol (vitamin D2) 1,250 mcg (50,000 unit) capsule\par 10/05/20   filled	[facility]\par erythromycin 5 mg/gram (0.5 %) eye ointment\par APPLY A SMALL AMOUNT INTO RIGHT EYE TID\par 20   filled	[facility]\par fluocinolone 0.01 % topical solution\par 19   filled	[facility]\par GaviLyte-G 236 gram-22.74 gram-6.74 gram-5.86 gram oral solution\par 17   filled	[facility]\par gentamicin 0.3 % eye drops\par 18   filled	[facility]\par HumaLOG U-100 Insulin 100 unit/mL subcutaneous solution\par 10/28/20   filled	[facility]\par ibuprofen 400 mg tablet\par 16   filled	[facility]\par ketoconazole 2 % shampoo\par 05/15/19   filled	[facility]\par lancets 30 gauge\par 05/28/15   filled	[facility]\par lancing device\par 05/28/15   filled	[facility]\par losartan 25 mg tablet\par 20   filled	[facility]\par Lupron Depot 11.25 mg (3 month) intramuscular syringe kit\par 16   filled	[facility]\par neomycin-polymyxin-hydrocort 3.5 mg-10,000 unit/mL-1 % ear drops,susp\par 19   filled	[facility]\par nystatin 100,000 unit/mL oral suspension\par 17   filled	[facility]\par rosuvastatin 5 mg tablet\par 10/15/20   filled	[facility]\par simvastatin 20 mg tablet\par 16   filled	[facility]\par sodium chloride 5 % eye drops\par INT 1 GTT IN OD QID UTD\par 20   filled	[facility]\par Synalar TS 0.01 % topical kit\par 16   filled	[facility]\par tobramycin 0.3 %-dexamethasone 0.1 % eye drops,suspension\par 18   filled	[facility]\par valACYclovir 1 gram tablet\par 18   filled	[facility]\par Problems\par Reviewed Problems\par Menorrhagia\par Gynecologic examination\par Family History\par Reviewed Family History\par Father	- Cerebrovascular accident ( age: 64)\par Mother	- Hypertensive disorder\par Social History\par Reviewed Social History\par Tobacco Smoking Status: Never smoker\par Most Recent Tobacco Use Screenin/10/2021\par Surgical History\par Surgical History not reviewed (last reviewed 01/15/2020)\par Endometrial ablation - 2016\par Caesarean Section - X2\par Dilation and Curettage\par Other - ENDOMETRIAL BIOPSY 2016 NORMAL AS PER DR. PULIDO\par GYN History\par Reviewed GYN History\par LMP: Definite.\par Date of LMP: 2016 (Notes: ablation).\par LUPRON ON \par Obstetric History\par Reviewed Obstetric History\par TOTAL	FULL	PRE	AB. I	AB. S	ECTOPICS	MULTIPLE	LIVING\par 2	2						2\par Past Medical History\par Past Medical History not reviewed (last reviewed 2018)\par Diabetes: Y\par Screening\par None recorded.\par HPI\par amenorrhea/cramps\par \par ROS\par Patient reports abdominal pain (lower) but reports no heartburn, no dysphagia, no nausea, no vomiting, no bowel movement changes, no diarrhea, no constipation, and no rectal bleeding. She reports abnormal bleeding but reports no hematuria, no flank pain, no trouble urinating, no incontinence, no rash, no lesion, no discharge, no vaginal odor, and no vaginal itching. She reports no fatigue, no fever, no significant weight gain, and no significant weight loss. She reports no abnormal moles and no rashes. She reports no irritation and no vision changes. She reports no hearing loss, no ear pain, no nose/sinus problems, no sore throat, no snoring, no dry mouth, and no mouth ulcers. She reports no dyspnea / shortness of breath, no cough, no sputum production, no hemoptysis, and no wheezing. She reports no chest pain, no palpitations, and no orthopnea. She reports no menstrual problems and no PMDD symptoms. She reports no menopausal symptoms. She reports no sexual problems. She reports no muscle aches, no muscle weakness, no arthralgias/joint pain, and no back pain. She reports no headaches, no dizziness, no LOC, no weakness, no numbness, and no seizures. She reports no depression, no alcoholism, and no sleep disturbances.\par Physical Exam\par Patient is a 50-year-old female.\par \par Chaperone: Chaperone: present.\par \par Female Genitalia: Vulva: no masses, atrophy, or lesions. Bladder/Urethra: no urethral discharge or mass and normal meatus and bladder non distended. Vagina no tenderness, erythema, cystocele, rectocele, abnormal vaginal discharge, or vesicle(s) or ulcers. Cervix: no discharge or cervical motion tenderness and grossly normal. Uterus: midline, mobile, non-tender, normal shape, no uterine prolapse, and enlarged. Adnexa/Parametria: no parametrial tenderness or mass and no adnexal tenderness or ovarian mass.\par \par Breast: Inspection/Palpation: no erythema, induration, tenderness, skin changes, abnormal secretions, or distinct masses and normal nipple appearance and non tender axillary lymph nodes.\par \par Abdomen: Auscultation/Inspection/Palpation: no tenderness, hepatomegaly, splenomegaly, masses, or CVA tenderness and soft, non-distended, and normal bowel sounds. Hernia: none palpated.\par Assessment / Plan\par 1. Gynecologic examination\par Z01.411: Encounter for gynecological examination (general) (routine) with abnormal findings\par PAP, LB\par MAMMO, SCREENING, BILATERAL\par \par 2. Atypical squamous cells of undetermined significance on cervical Papanicolaou smear -\par high risk pap panel negative, pap in 6 months\par \par R87.610: Atypical squamous cells of undetermined significance on cytologic smear of cervix (ASC-US)\par \par 3. Hypertrophy of uterus -\par cramps\par \par N85.2: Hypertrophy of uterus\par \par 4. Amenorrhea -\par SINCE ABLATION\par \par hormone profile to r/o menopause\par \par N91.2: Amenorrhea, unspecified\par US, TRANSVAGINAL\par \par US, TRANSVAGINAL\par \par Review of us, transvaginal taken on 02/10/2021 at [facility] OFFICE shows:\par Imaging Studies:\par Indications: pelvic pain and abnormal bleeding.\par Uterus: volume (cc): 125.\par Endometrium: thickness 2.3 mm.\par Cervix: normal.\par Cul de sac: no fluid was demonstrated.\par Right Ovary: volume (cc): 3.9.\par Left Ovary: volume (cc): 6.8.\par \par Return to Office\par None recorded.\par Encounter Sign-Off\par Encounter signed-off by Sherly Harris MD, 02/10/2021.\par Encounter performed and documented by Sherly Harris MD\par Encounter reviewed & signed by Sherly Harris MD on 02/10/2021 at 4:49pm\par Audit history\par \par \par Image Documentation #[id number]\par

## 2023-01-29 LAB
CYTOLOGY CVX/VAG DOC THIN PREP: ABNORMAL
HPV HIGH+LOW RISK DNA PNL CVX: NOT DETECTED

## 2023-02-04 ENCOUNTER — EMERGENCY (EMERGENCY)
Facility: HOSPITAL | Age: 53
LOS: 0 days | Discharge: HOME | End: 2023-02-04
Attending: EMERGENCY MEDICINE | Admitting: EMERGENCY MEDICINE
Payer: COMMERCIAL

## 2023-02-04 VITALS — DIASTOLIC BLOOD PRESSURE: 83 MMHG | SYSTOLIC BLOOD PRESSURE: 136 MMHG | TEMPERATURE: 98 F | HEART RATE: 78 BPM

## 2023-02-04 VITALS — HEIGHT: 67 IN | WEIGHT: 164.91 LBS

## 2023-02-04 DIAGNOSIS — Z86.19 PERSONAL HISTORY OF OTHER INFECTIOUS AND PARASITIC DISEASES: ICD-10-CM

## 2023-02-04 DIAGNOSIS — Z79.4 LONG TERM (CURRENT) USE OF INSULIN: ICD-10-CM

## 2023-02-04 DIAGNOSIS — M54.2 CERVICALGIA: ICD-10-CM

## 2023-02-04 DIAGNOSIS — B02.9 ZOSTER WITHOUT COMPLICATIONS: ICD-10-CM

## 2023-02-04 DIAGNOSIS — R21 RASH AND OTHER NONSPECIFIC SKIN ERUPTION: ICD-10-CM

## 2023-02-04 DIAGNOSIS — Z79.82 LONG TERM (CURRENT) USE OF ASPIRIN: ICD-10-CM

## 2023-02-04 PROCEDURE — 99284 EMERGENCY DEPT VISIT MOD MDM: CPT

## 2023-02-04 RX ORDER — VALACYCLOVIR 500 MG/1
1 TABLET, FILM COATED ORAL
Qty: 21 | Refills: 0
Start: 2023-02-04 | End: 2023-02-10

## 2023-02-04 NOTE — ED PROVIDER NOTE - OBJECTIVE STATEMENT
Patient is a 52-year-old female here for evaluation of painful rash to right upper back x2 days.  Patient denies chest pain, shortness of breath

## 2023-02-04 NOTE — ED PROVIDER NOTE - CLINICAL SUMMARY MEDICAL DECISION MAKING FREE TEXT BOX
Pt presents with rash on the neck most consistent with shingles of less than 72 hours duration. Agrees to antiviral treatment : Valacyclovir 1 g tid x 7 days.

## 2023-02-04 NOTE — ED PROVIDER NOTE - NS ED ROS FT
MS:  No myalgia, muscle weakness, joint pain or back pain.  Neuro:  No headache or weakness.  No LOC.  Skin:  +rash   Endocrine: No history of thyroid disease or diabetes.  Except as documented in the HPI,  all other systems are negative.

## 2023-02-04 NOTE — ED PROVIDER NOTE - ATTENDING APP SHARED VISIT CONTRIBUTION OF CARE
Kenalog Preparation: Kenalog Pt noted pain to the right side of the neck and then rash to the right side of the neck. Pain is sharp. Hx of chicken pox in the past.   PE cluster of small vesicles note to the right lateral neck.

## 2023-02-04 NOTE — ED PROVIDER NOTE - PHYSICAL EXAMINATION
VITAL SIGNS: I have reviewed nursing notes and confirm.  CONSTITUTIONAL: Well-developed; well-nourished; in no acute distress.   SKIN: Vesicular rash to right upper back  Remainder of skin exam is warm and dry, no acute rash.    HEAD: Normocephalic; atraumatic.  EYES: conjunctiva and sclera clear.  ENT: No nasal discharge; airway clear.  EXT: Normal ROM.  No clubbing, cyanosis or edema.   NEURO: Alert, oriented, grossly unremarkable
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
## 2023-02-04 NOTE — ED PROVIDER NOTE - PATIENT PORTAL LINK FT
You can access the FollowMyHealth Patient Portal offered by [facility] by registering at the following website: [email or web address]. By joining [facility]’s FollowMyHealth portal, you will also be able to view your health information using other applications (apps) compatible with our system.

## 2023-03-07 ENCOUNTER — APPOINTMENT (OUTPATIENT)
Dept: ORTHOPEDIC SURGERY | Facility: CLINIC | Age: 53
End: 2023-03-07
Payer: OTHER MISCELLANEOUS

## 2023-03-07 DIAGNOSIS — M75.52 BURSITIS OF LEFT SHOULDER: ICD-10-CM

## 2023-03-07 PROCEDURE — 99213 OFFICE O/P EST LOW 20 MIN: CPT | Mod: ACP

## 2023-03-07 PROCEDURE — 99072 ADDL SUPL MATRL&STAF TM PHE: CPT

## 2023-03-07 NOTE — HISTORY OF PRESENT ILLNESS
[de-identified] : The patient is a 52-year-old female here for a subsequent reevaluation of her left shoulder.  He has good and bad days with her shoulder.  She finished up formal physical therapy.  She works as a PCA.  She is not doing any home therapy exercises for the shoulder.

## 2023-03-07 NOTE — DISCUSSION/SUMMARY
[de-identified] : I recommend she does home therapy exercises for the left shoulder.  She may take over the counter anti-inflammatory medication on an as-needed basis.  I will see her back in 2 months for further evaluation.  She is working full duty, mild degree of disability.\par \par Supervising Physician: Dr. Koo

## 2023-03-07 NOTE — PHYSICAL EXAM
[Left] : left shoulder [] : motor and sensory intact distally [FreeTextEntry8] : Mild tenderness to palpation over the anterior aspect of the left shoulder. [de-identified] :   Good strength with rotator cuff resistance testing. [TWNoteComboBox7] : active forward flexion 170 degrees [de-identified] : active abduction 170 degrees [TWNoteComboBox6] : internal rotation L2

## 2023-05-16 ENCOUNTER — APPOINTMENT (OUTPATIENT)
Dept: CARDIOLOGY | Facility: CLINIC | Age: 53
End: 2023-05-16

## 2023-05-31 ENCOUNTER — APPOINTMENT (OUTPATIENT)
Dept: ORTHOPEDIC SURGERY | Facility: CLINIC | Age: 53
End: 2023-05-31

## 2023-06-20 ENCOUNTER — APPOINTMENT (OUTPATIENT)
Dept: ORTHOPEDIC SURGERY | Facility: CLINIC | Age: 53
End: 2023-06-20

## 2023-10-03 NOTE — ED ADULT TRIAGE NOTE - AS HEIGHT TYPE
stated [place] Pregnancy And Lactation Text: There is insufficient data to evaluate drug-associated risk for major birth defects, miscarriage, or other adverse maternal or fetal outcomes.  There is a pregnancy registry that monitors pregnancy outcomes in pregnant persons exposed to the medication during pregnancy.  It is unknown if this medication is excreted in breast milk.  Do not breastfeed during treatment and for about 4 weeks after the last dose.

## 2023-10-09 ENCOUNTER — APPOINTMENT (OUTPATIENT)
Dept: ORTHOPEDIC SURGERY | Facility: CLINIC | Age: 53
End: 2023-10-09
Payer: COMMERCIAL

## 2023-10-09 VITALS — BODY MASS INDEX: 25.9 KG/M2 | WEIGHT: 165 LBS | HEIGHT: 67 IN

## 2023-10-09 DIAGNOSIS — G56.01 CARPAL TUNNEL SYNDROME, RIGHT UPPER LIMB: ICD-10-CM

## 2023-10-09 DIAGNOSIS — M65.331 TRIGGER FINGER, RIGHT MIDDLE FINGER: ICD-10-CM

## 2023-10-09 PROCEDURE — 99202 OFFICE O/P NEW SF 15 MIN: CPT

## 2023-11-17 ENCOUNTER — APPOINTMENT (OUTPATIENT)
Dept: NEUROLOGY | Facility: CLINIC | Age: 53
End: 2023-11-17

## 2023-11-21 ENCOUNTER — APPOINTMENT (OUTPATIENT)
Dept: NEUROLOGY | Facility: CLINIC | Age: 53
End: 2023-11-21
Payer: COMMERCIAL

## 2023-11-21 PROCEDURE — 95911 NRV CNDJ TEST 9-10 STUDIES: CPT

## 2023-11-21 PROCEDURE — 95886 MUSC TEST DONE W/N TEST COMP: CPT

## 2024-01-31 ENCOUNTER — APPOINTMENT (OUTPATIENT)
Dept: OBGYN | Facility: CLINIC | Age: 54
End: 2024-01-31
Payer: COMMERCIAL

## 2024-01-31 VITALS
DIASTOLIC BLOOD PRESSURE: 79 MMHG | WEIGHT: 172 LBS | HEART RATE: 87 BPM | HEIGHT: 67 IN | BODY MASS INDEX: 27 KG/M2 | SYSTOLIC BLOOD PRESSURE: 137 MMHG

## 2024-01-31 DIAGNOSIS — Z01.419 ENCOUNTER FOR GYNECOLOGICAL EXAMINATION (GENERAL) (ROUTINE) W/OUT ABNORMAL FINDINGS: ICD-10-CM

## 2024-01-31 DIAGNOSIS — N95.1 MENOPAUSAL AND FEMALE CLIMACTERIC STATES: ICD-10-CM

## 2024-01-31 LAB
BILIRUB UR QL STRIP: NEGATIVE
CLARITY UR: CLEAR
COLLECTION METHOD: NORMAL
GLUCOSE UR-MCNC: ABNORMAL
HCG UR QL: 0.2 EU/DL
HGB UR QL STRIP.AUTO: ABNORMAL
KETONES UR-MCNC: NEGATIVE
LEUKOCYTE ESTERASE UR QL STRIP: NEGATIVE
NITRITE UR QL STRIP: NEGATIVE
PH UR STRIP: 6.5
PROT UR STRIP-MCNC: NEGATIVE
SP GR UR STRIP: 1.01

## 2024-01-31 PROCEDURE — 81003 URINALYSIS AUTO W/O SCOPE: CPT | Mod: QW

## 2024-01-31 PROCEDURE — 99396 PREV VISIT EST AGE 40-64: CPT

## 2024-01-31 RX ORDER — LOSARTAN POTASSIUM 25 MG/1
25 TABLET, FILM COATED ORAL DAILY
Refills: 0 | Status: COMPLETED | COMMUNITY
End: 2024-01-31

## 2024-01-31 RX ORDER — LOSARTAN POTASSIUM 50 MG/1
50 TABLET, FILM COATED ORAL
Refills: 0 | Status: ACTIVE | COMMUNITY

## 2024-01-31 NOTE — HISTORY OF PRESENT ILLNESS
[FreeTextEntry1] : HERE FOR HER ANNUAL FEELS WELL NO COMPLAINTS FAMILY FROM THE [facility] [place] [Hot Flashes] : hot flashes

## 2024-02-02 LAB — HPV HIGH+LOW RISK DNA PNL CVX: NOT DETECTED

## 2024-02-12 LAB — CYTOLOGY CVX/VAG DOC THIN PREP: ABNORMAL

## 2024-03-18 ENCOUNTER — RX RENEWAL (OUTPATIENT)
Age: 54
End: 2024-03-18

## 2024-03-18 RX ORDER — ROSUVASTATIN CALCIUM 10 MG/1
10 TABLET, FILM COATED ORAL DAILY
Qty: 90 | Refills: 3 | Status: ACTIVE | COMMUNITY
Start: 1900-01-01 | End: 1900-01-01

## 2024-08-25 PROBLEM — M25.511 RIGHT SHOULDER PAIN: Status: ACTIVE | Noted: 2024-08-25

## 2024-08-25 PROBLEM — M54.12 CERVICAL RADICULOPATHY, ACUTE: Status: ACTIVE | Noted: 2024-08-25

## 2024-10-07 ENCOUNTER — APPOINTMENT (OUTPATIENT)
Dept: ORTHOPEDIC SURGERY | Facility: CLINIC | Age: 54
End: 2024-10-07
Payer: COMMERCIAL

## 2024-10-07 DIAGNOSIS — M54.12 RADICULOPATHY, CERVICAL REGION: ICD-10-CM

## 2024-10-07 PROCEDURE — 99203 OFFICE O/P NEW LOW 30 MIN: CPT

## 2024-10-15 ENCOUNTER — APPOINTMENT (OUTPATIENT)
Dept: MRI IMAGING | Facility: CLINIC | Age: 54
End: 2024-10-15
Payer: COMMERCIAL

## 2024-10-15 PROCEDURE — 72141 MRI NECK SPINE W/O DYE: CPT

## 2024-10-23 ENCOUNTER — NON-APPOINTMENT (OUTPATIENT)
Age: 54
End: 2024-10-23

## 2024-10-23 ENCOUNTER — APPOINTMENT (OUTPATIENT)
Facility: CLINIC | Age: 54
End: 2024-10-23
Payer: COMMERCIAL

## 2024-10-23 DIAGNOSIS — M54.12 RADICULOPATHY, CERVICAL REGION: ICD-10-CM

## 2024-10-23 PROCEDURE — 99213 OFFICE O/P EST LOW 20 MIN: CPT

## 2024-11-06 ENCOUNTER — APPOINTMENT (OUTPATIENT)
Dept: NEUROLOGY | Facility: CLINIC | Age: 54
End: 2024-11-06
Payer: COMMERCIAL

## 2024-11-06 VITALS — WEIGHT: 168 LBS | HEIGHT: 67 IN | BODY MASS INDEX: 26.37 KG/M2

## 2024-11-06 VITALS — SYSTOLIC BLOOD PRESSURE: 148 MMHG | HEART RATE: 101 BPM | DIASTOLIC BLOOD PRESSURE: 74 MMHG

## 2024-11-06 DIAGNOSIS — G56.01 CARPAL TUNNEL SYNDROME, RIGHT UPPER LIMB: ICD-10-CM

## 2024-11-06 DIAGNOSIS — M54.12 RADICULOPATHY, CERVICAL REGION: ICD-10-CM

## 2024-11-06 PROCEDURE — 99214 OFFICE O/P EST MOD 30 MIN: CPT

## 2024-11-22 ENCOUNTER — APPOINTMENT (OUTPATIENT)
Dept: NEUROLOGY | Facility: CLINIC | Age: 54
End: 2024-11-22

## 2024-11-26 NOTE — ED PROVIDER NOTE - PROGRESS NOTE DETAILS
Is This A New Presentation, Or A Follow-Up?: Skin Lesion TC TC: 48 yo F with hx of DM who was sent in by her boss to r/o flu before returning to work. Pt was exposed to a person with the flu 2 days ago and today developed chills/sore throat. Afebrile. No airway compromise here. Flu swab sent. TC: Flu/RSV negative. Provided printed report of results and given work note with clearance to return. Strict ED return precautions given. Pt verbalized understanding and was agreeable with plan.

## 2024-12-16 ENCOUNTER — APPOINTMENT (OUTPATIENT)
Dept: NEUROLOGY | Facility: CLINIC | Age: 54
End: 2024-12-16

## 2025-01-31 DIAGNOSIS — Z12.39 ENCOUNTER FOR OTHER SCREENING FOR MALIGNANT NEOPLASM OF BREAST: ICD-10-CM

## 2025-02-05 ENCOUNTER — NON-APPOINTMENT (OUTPATIENT)
Age: 55
End: 2025-02-05

## 2025-02-05 ENCOUNTER — APPOINTMENT (OUTPATIENT)
Dept: OBGYN | Facility: CLINIC | Age: 55
End: 2025-02-05
Payer: COMMERCIAL

## 2025-02-05 DIAGNOSIS — Z01.419 ENCOUNTER FOR GYNECOLOGICAL EXAMINATION (GENERAL) (ROUTINE) W/OUT ABNORMAL FINDINGS: ICD-10-CM

## 2025-02-05 LAB
BILIRUB UR QL STRIP: NEGATIVE
CLARITY UR: CLEAR
COLLECTION METHOD: NORMAL
GLUCOSE UR-MCNC: NEGATIVE
HCG UR QL: 0.2 EU/DL
HGB UR QL STRIP.AUTO: NEGATIVE
KETONES UR-MCNC: ABNORMAL
LEUKOCYTE ESTERASE UR QL STRIP: NEGATIVE
NITRITE UR QL STRIP: NEGATIVE
PH UR STRIP: 7
PROT UR STRIP-MCNC: NEGATIVE
SP GR UR STRIP: 1.01

## 2025-02-05 PROCEDURE — 99459 PELVIC EXAMINATION: CPT

## 2025-02-05 PROCEDURE — 99386 PREV VISIT NEW AGE 40-64: CPT

## 2025-02-05 PROCEDURE — 81003 URINALYSIS AUTO W/O SCOPE: CPT | Mod: QW

## 2025-02-08 LAB — HPV HIGH+LOW RISK DNA PNL CVX: NOT DETECTED

## 2025-02-13 LAB — CYTOLOGY CVX/VAG DOC THIN PREP: ABNORMAL

## 2025-06-23 ENCOUNTER — EMERGENCY (EMERGENCY)
Facility: HOSPITAL | Age: 55
LOS: 0 days | Discharge: HOME | End: 2025-06-23
Attending: EMERGENCY MEDICINE
Payer: COMMERCIAL

## 2025-06-23 VITALS
OXYGEN SATURATION: 99 % | HEART RATE: 88 BPM | DIASTOLIC BLOOD PRESSURE: 80 MMHG | TEMPERATURE: 98 F | SYSTOLIC BLOOD PRESSURE: 155 MMHG | WEIGHT: 149.91 LBS | RESPIRATION RATE: 16 BRPM

## 2025-06-23 DIAGNOSIS — Z77.21 CONTACT WITH AND (SUSPECTED) EXPOSURE TO POTENTIALLY HAZARDOUS BODY FLUIDS: ICD-10-CM

## 2025-06-23 PROCEDURE — 99282 EMERGENCY DEPT VISIT SF MDM: CPT

## 2025-06-23 PROCEDURE — 99283 EMERGENCY DEPT VISIT LOW MDM: CPT
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
## 2025-06-23 NOTE — ED PROVIDER NOTE - PATIENT PORTAL LINK FT
You can access the FollowMyHealth Patient Portal offered by [facility] by registering at the following website: [email or web address]. By joining [facility]’s FollowMyHealth portal, you will also be able to view your health information using other applications (apps) compatible with our system.

## 2025-08-21 ENCOUNTER — APPOINTMENT (OUTPATIENT)
Dept: CARDIOLOGY | Facility: CLINIC | Age: 55
End: 2025-08-21
Payer: COMMERCIAL

## 2025-08-21 VITALS
DIASTOLIC BLOOD PRESSURE: 78 MMHG | BODY MASS INDEX: 26.37 KG/M2 | HEIGHT: 67 IN | HEART RATE: 78 BPM | SYSTOLIC BLOOD PRESSURE: 128 MMHG | WEIGHT: 168 LBS

## 2025-08-21 DIAGNOSIS — R06.02 SHORTNESS OF BREATH: ICD-10-CM

## 2025-08-21 DIAGNOSIS — Z79.4 TYPE 2 DIABETES MELLITUS W/OUT COMPLICATIONS: ICD-10-CM

## 2025-08-21 DIAGNOSIS — E11.9 TYPE 2 DIABETES MELLITUS W/OUT COMPLICATIONS: ICD-10-CM

## 2025-08-21 DIAGNOSIS — E78.5 HYPERLIPIDEMIA, UNSPECIFIED: ICD-10-CM

## 2025-08-21 DIAGNOSIS — I10 ESSENTIAL (PRIMARY) HYPERTENSION: ICD-10-CM

## 2025-08-21 PROCEDURE — 99214 OFFICE O/P EST MOD 30 MIN: CPT

## 2025-08-21 PROCEDURE — 93000 ELECTROCARDIOGRAM COMPLETE: CPT

## 2025-08-21 RX ORDER — ASPIRIN 81 MG/1
81 TABLET ORAL WEEKLY
Refills: 0 | Status: ACTIVE | COMMUNITY

## 2025-08-21 RX ORDER — METOPROLOL TARTRATE 50 MG/1
50 TABLET ORAL
Qty: 2 | Refills: 0 | Status: ACTIVE | COMMUNITY
Start: 2025-08-21 | End: 1900-01-01

## 2025-09-15 ENCOUNTER — APPOINTMENT (OUTPATIENT)
Dept: ORTHOPEDIC SURGERY | Facility: CLINIC | Age: 55
End: 2025-09-15

## 2025-09-16 ENCOUNTER — APPOINTMENT (OUTPATIENT)
Dept: CARDIOLOGY | Facility: CLINIC | Age: 55
End: 2025-09-16
Payer: COMMERCIAL

## 2025-09-16 PROCEDURE — 93306 TTE W/DOPPLER COMPLETE: CPT
